# Patient Record
Sex: MALE | Race: WHITE | NOT HISPANIC OR LATINO | Employment: FULL TIME | URBAN - METROPOLITAN AREA
[De-identification: names, ages, dates, MRNs, and addresses within clinical notes are randomized per-mention and may not be internally consistent; named-entity substitution may affect disease eponyms.]

---

## 2022-12-14 ENCOUNTER — HOSPITAL ENCOUNTER (OUTPATIENT)
Facility: HOSPITAL | Age: 27
Discharge: HOME | End: 2022-12-14
Attending: EMERGENCY MEDICINE

## 2022-12-14 VITALS
HEART RATE: 97 BPM | HEIGHT: 75 IN | DIASTOLIC BLOOD PRESSURE: 84 MMHG | BODY MASS INDEX: 31.08 KG/M2 | TEMPERATURE: 98.4 F | RESPIRATION RATE: 17 BRPM | SYSTOLIC BLOOD PRESSURE: 135 MMHG | WEIGHT: 250 LBS | OXYGEN SATURATION: 96 %

## 2022-12-14 DIAGNOSIS — U07.1 COVID-19: Primary | ICD-10-CM

## 2022-12-14 LAB
FLUAV SUBTYP SPEC NAA+PROBE: NOT DETECTED
FLUBV RNA ISLT QL NAA+PROBE: NOT DETECTED
SARS-COV-2 RNA RESP QL NAA+PROBE: DETECTED

## 2022-12-14 PROCEDURE — EDLOS: Performed by: NURSE PRACTITIONER

## 2022-12-14 PROCEDURE — G0463 HOSPITAL OUTPT CLINIC VISIT: HCPCS | Performed by: NURSE PRACTITIONER

## 2022-12-14 PROCEDURE — 99202 OFFICE O/P NEW SF 15 MIN: CPT | Performed by: NURSE PRACTITIONER

## 2022-12-14 PROCEDURE — 87636 SARSCOV2 & INF A&B AMP PRB: CPT | Performed by: EMERGENCY MEDICINE

## 2022-12-14 PROCEDURE — 87636 SARSCOV2 & INF A&B AMP PRB: CPT | Performed by: NURSE PRACTITIONER

## 2022-12-15 NOTE — [facility] PROVIDER NOTE
EMERGENCY DEPARTMENT ENCOUNTER    Room Number:  11/11  Date seen:  12/14/2022  Time seen: 20:38 EST  PCP: Provider, No Known  Historian: patient    HPI:  Chief complaint:flu like symptoms  A complete HPI/ROS/PMH/PSH/SH/FH are unobtainable due to: n/a  Context:Harman Pandya is a 27 y.o. male who presents to the ED with c/o mild cough that started on Monday and today he developed body aches and chills with mild sore throat. His symptoms are generally mild and not made better/worse by anything. He has not had these problems before.  Resides in [place] and is here for work.  Is vaccinated against Covid 19 but not influenza.  He is not a smoker and has no h/o asthma.     MEDICAL RECORD REVIEW    ALLERGIES  Patient has no known allergies.    PAST MEDICAL HISTORY  Active Ambulatory Problems     Diagnosis Date Noted   • No Active Ambulatory Problems     Resolved Ambulatory Problems     Diagnosis Date Noted   • No Resolved Ambulatory Problems     No Additional Past Medical History       PAST SURGICAL HISTORY  No past surgical history on file.    FAMILY HISTORY  No family history on file.    SOCIAL HISTORY  Social History     Socioeconomic History   • Marital status:        REVIEW OF SYSTEMS  Review of Systems    All systems reviewed and negative except for those discussed in HPI.     PHYSICAL EXAM    ED Triage Vitals [12/14/22 2021]   Temp Heart Rate Resp BP SpO2   98.4 °F (36.9 °C) 97 17 135/84 96 %      Temp src Heart Rate Source Patient Position BP Location FiO2 (%)   -- -- -- -- --     Physical Exam      I have reviewed the triage vital signs and nursing notes.      GENERAL: not distressed  HENT: nares patent, mm moist. No posterior oral pharynx erythema, edema or exudates  EYES: no scleral icterus  NECK: no ROM limitations  CV: regular rhythm, regular rate, no murmur  RESPIRATORY: normal effort, CTAB, no wheezing. Speaks in full sentences  ABDOMEN: soft  : deferred  MUSCULOSKELETAL: no deformity  NEURO: alert,  moves all extremities, follows commands  SKIN: warm, dry    LAB RESULTS  Recent Results (from the past 24 hour(s))   COVID-19 and FLU A/B PCR - Swab, Nasopharynx    Collection Time: 12/14/22  8:25 PM    Specimen: Nasopharynx; Swab   Result Value Ref Range    COVID19 Detected (C) Not Detected - Ref. Range    Influenza A PCR Not Detected Not Detected    Influenza B PCR Not Detected Not Detected         RADIOLOGY RESULTS  No Radiology Exams Resulted Within Past 24 Hours       PROGRESS, DATA ANALYSIS, CONSULTS AND MEDICAL DECISION MAKING  All labs have been independently reviewed by me.  All radiology studies have been reviewed by me and discussed with radiologist dictating the report.  EKG's independently viewed and interpreted by me unless stated otherwise. Discussion below represents my analysis of pertinent findings related to patient's condition, differential diagnosis, treatment plan and final disposition.       Orders placed during this visit:  Orders Placed This Encounter   Procedures   • COVID-19 and FLU A/B PCR - Swab, Nasopharynx         ED Course as of 12/14/22 2115   Wed Dec 14, 2022   2112 COVID19(!!): Detected  I updated patient about COVID-19 status.  We discussed home quarantine.  He is not tachycardic or hypoxic and I think he will do quite well.  Questions answered and I referred him to the CDC website for quarantine guidelines.   [EW]      ED Course User Index  [EW] Rima Shaw APRN         DIAGNOSIS  Final diagnoses:   COVID-19       FOLLOW-UP  Your primary care provider in [place]              Latest Documented Vital Signs:  As of 21:15 EST  BP- 135/84 HR- 97 Temp- 98.4 °F (36.9 °C) O2 sat- 96%    Please note that portions of this were completed with a voice recognition program.     Note Disclaimer: At [facility], we believe that sharing information builds trust and better relationships. You are receiving this note because you are receiving care at [facility] or recently visited. It  is possible you will see health information before a provider has talked with you about it. This kind of information can be easy to misunderstand. To help you fully understand what it means for your health, we urge you to discuss this note with your provider.

## 2022-12-15 NOTE — DISCHARGE INSTRUCTIONS
Wash/sanitize common household surfaces with antibacterial wipes.  Especially door knobs, light switches.    Change bed linens and wash bath towels/washcloths    Frequent handwashing    Cough/sneeze into your sleeve    Treat fever every 6-8 hours with adult Tylenol (generic acetaminophen) or Ibuprofen according to package directions.    Over the counter Delsym cough syrup (store brand is fine) as needed per package directions for cough    Return Precautions    Although you are being discharged from the ED today, I encourage you to return for worsening symptoms.  Things can, and do, change such that treatment at home with medication may not be adequate.      Specifically, return for any of the following:    Chest pain, shortness of breath, pain or nausea and vomiting not controlled by medications provided.    Please make a follow up with your Primary Care Provider for a blood pressure recheck.